# Patient Record
Sex: FEMALE | Race: WHITE | NOT HISPANIC OR LATINO | URBAN - METROPOLITAN AREA
[De-identification: names, ages, dates, MRNs, and addresses within clinical notes are randomized per-mention and may not be internally consistent; named-entity substitution may affect disease eponyms.]

---

## 2020-03-12 ENCOUNTER — OFFICE VISIT - HEALTHEAST (OUTPATIENT)
Dept: INTERNAL MEDICINE | Facility: CLINIC | Age: 25
End: 2020-03-12

## 2020-03-12 DIAGNOSIS — F32.A DEPRESSION, UNSPECIFIED DEPRESSION TYPE: ICD-10-CM

## 2020-03-12 DIAGNOSIS — F39 MOOD DISORDER (H): ICD-10-CM

## 2020-03-12 ASSESSMENT — MIFFLIN-ST. JEOR: SCORE: 1267.06

## 2021-06-04 VITALS
DIASTOLIC BLOOD PRESSURE: 70 MMHG | HEIGHT: 61 IN | SYSTOLIC BLOOD PRESSURE: 110 MMHG | WEIGHT: 130 LBS | OXYGEN SATURATION: 99 % | HEART RATE: 88 BPM | BODY MASS INDEX: 24.55 KG/M2

## 2021-06-06 NOTE — PROGRESS NOTES
Office Visit   Nisha Barrios   25 y.o. female    Date of Visit: 3/12/2020    Chief Complaint   Patient presents with     Follow-up     Hospital follow up        Assessment and Plan   1. Mood disorder (H)  She was recently hospitalized.  She is now on fluoxetine and olanzapine.  She feels her mood is much better on these medications.  She is no longer having symptoms of depression.  She is not having any significant side effects.  She is scheduled to see a psychiatrist in a couple of weeks.  She will keep this appointment.  She states that she does not need any medication refills in the meantime.    2. Depression, unspecified depression type         Return in about 4 weeks (around 4/9/2020) for Annual physical.     History of Present Illness   This 25 y.o. old female comes in for hospital follow-up.  She has depression and had suicidal ideation.  She was hospitalized and now is on fluoxetine and olanzapine.  She seems to be doing better.  She states her mood seems more stable and she is no longer feeling depressed.  She has not had any significant side effects.  She does have an appointment scheduled in psychiatry on the 23rd.  She has no acute concerns today.  We did discuss that she is due for a physical and a Pap smear and she is going to consider this.  She declines to set that up today.  She has no other chronic medical problems.    Review of Systems: As above, systems otherwise reviewed and negative.     Medications, Allergies and Problem List   Patient Active Problem List   Diagnosis     Mood disorder (H)     Depression, unspecified depression type     Sensorineural hearing loss (SNHL) of both ears     Current Outpatient Medications   Medication Sig Dispense Refill     FLUoxetine (PROZAC) 20 mg/5 mL (4 mg/mL) Take 5 mL (20 mg total) by mouth at bedtime for 15 days. Pt works 3rd shift. Take at pt's bedtime. 20mL/24 hours. 75 mL 0     OLANZapine zydis (ZYPREXA ZYDIS) 5 MG disintegrating tablet Take 0.5 tablets  "(2.5 mg total) by mouth at bedtime. 10 tablet 0     No current facility-administered medications for this visit.      Post Discharge Medication Reconciliation Status: discharge medications reconciled, continue medications without change    No Known Allergies       Physical Exam     /70 (Patient Site: Right Arm, Patient Position: Sitting)   Pulse 88   Ht 5' 1\" (1.549 m)   Wt 130 lb (59 kg)   LMP 02/28/2020   SpO2 99%   BMI 24.56 kg/m      General: This is an alert female in no apparent distress.     Additional Information   Social History     Tobacco Use     Smoking status: Never Smoker     Smokeless tobacco: Never Used   Substance Use Topics     Alcohol use: Yes     Alcohol/week: 4.0 standard drinks     Types: 4 Cans of beer per week     Comment: daily     Drug use: Yes     Types: Cocaine, Oxycodone            Safia Guadalupe MD    "

## 2021-06-16 PROBLEM — F39 MOOD DISORDER (H): Status: ACTIVE | Noted: 2020-03-03

## 2021-06-16 PROBLEM — F32.A DEPRESSION, UNSPECIFIED DEPRESSION TYPE: Status: ACTIVE | Noted: 2020-03-09

## 2021-06-26 ENCOUNTER — HEALTH MAINTENANCE LETTER (OUTPATIENT)
Age: 26
End: 2021-06-26

## 2021-10-16 ENCOUNTER — HEALTH MAINTENANCE LETTER (OUTPATIENT)
Age: 26
End: 2021-10-16

## 2022-07-23 ENCOUNTER — HEALTH MAINTENANCE LETTER (OUTPATIENT)
Age: 27
End: 2022-07-23

## 2022-10-01 ENCOUNTER — HEALTH MAINTENANCE LETTER (OUTPATIENT)
Age: 27
End: 2022-10-01

## 2023-08-06 ENCOUNTER — HEALTH MAINTENANCE LETTER (OUTPATIENT)
Age: 28
End: 2023-08-06